# Patient Record
Sex: FEMALE | Race: WHITE | NOT HISPANIC OR LATINO
[De-identification: names, ages, dates, MRNs, and addresses within clinical notes are randomized per-mention and may not be internally consistent; named-entity substitution may affect disease eponyms.]

---

## 2021-02-01 ENCOUNTER — APPOINTMENT (OUTPATIENT)
Dept: OBGYN | Facility: CLINIC | Age: 66
End: 2021-02-01

## 2021-02-08 ENCOUNTER — APPOINTMENT (OUTPATIENT)
Dept: OBGYN | Facility: CLINIC | Age: 66
End: 2021-02-08
Payer: MEDICARE

## 2021-02-08 VITALS
HEIGHT: 60 IN | OXYGEN SATURATION: 97 % | SYSTOLIC BLOOD PRESSURE: 131 MMHG | DIASTOLIC BLOOD PRESSURE: 62 MMHG | BODY MASS INDEX: 56.35 KG/M2 | WEIGHT: 287 LBS | HEART RATE: 71 BPM | TEMPERATURE: 98.4 F

## 2021-02-08 DIAGNOSIS — I10 ESSENTIAL (PRIMARY) HYPERTENSION: ICD-10-CM

## 2021-02-08 DIAGNOSIS — G47.30 SLEEP APNEA, UNSPECIFIED: ICD-10-CM

## 2021-02-08 DIAGNOSIS — N89.8 OTHER SPECIFIED NONINFLAMMATORY DISORDERS OF VAGINA: ICD-10-CM

## 2021-02-08 DIAGNOSIS — F17.200 NICOTINE DEPENDENCE, UNSPECIFIED, UNCOMPLICATED: ICD-10-CM

## 2021-02-08 DIAGNOSIS — N95.0 POSTMENOPAUSAL BLEEDING: ICD-10-CM

## 2021-02-08 PROCEDURE — 99204 OFFICE O/P NEW MOD 45 MIN: CPT

## 2021-02-08 RX ORDER — ALLOPURINOL 100 MG/1
100 TABLET ORAL
Qty: 90 | Refills: 0 | Status: ACTIVE | COMMUNITY
Start: 2020-12-18

## 2021-02-08 RX ORDER — CLOTRIMAZOLE AND BETAMETHASONE DIPROPIONATE 10; .5 MG/G; MG/G
1-0.05 CREAM TOPICAL TWICE DAILY
Qty: 1 | Refills: 1 | Status: ACTIVE | COMMUNITY
Start: 2021-02-08 | End: 1900-01-01

## 2021-02-08 RX ORDER — FUROSEMIDE 20 MG/1
20 TABLET ORAL
Refills: 0 | Status: ACTIVE | COMMUNITY

## 2021-02-08 RX ORDER — APIXABAN 5 MG/1
5 TABLET, FILM COATED ORAL
Refills: 0 | Status: ACTIVE | COMMUNITY

## 2021-02-08 RX ORDER — ATORVASTATIN CALCIUM 10 MG/1
10 TABLET, FILM COATED ORAL
Refills: 0 | Status: ACTIVE | COMMUNITY

## 2021-02-08 RX ORDER — PSYLLIUM SEED
PACKET (EA) ORAL
Refills: 0 | Status: ACTIVE | COMMUNITY

## 2021-02-08 RX ORDER — VALSARTAN 160 MG/1
160 TABLET, COATED ORAL
Refills: 0 | Status: ACTIVE | COMMUNITY

## 2021-02-08 RX ORDER — SITAGLIPTIN 50 MG/1
50 TABLET, FILM COATED ORAL
Refills: 0 | Status: ACTIVE | COMMUNITY

## 2021-02-08 RX ORDER — METOPROLOL SUCCINATE 50 MG/1
50 TABLET, EXTENDED RELEASE ORAL
Qty: 90 | Refills: 0 | Status: ACTIVE | COMMUNITY
Start: 2020-12-22

## 2021-02-08 NOTE — DISCUSSION/SUMMARY
[FreeTextEntry1] : pelvic exam including pap was attempted but incomplete due to body habitus. vaginal canal was deep and narrow, cervix was unable to be visualized or palpated by bimanual exam. pt also had great difficulty being in dorsolithotomy position due to severe back pain. \par TVUS referral provided. further planning pending imaging studies but will most likely need EUA, pap and endometrial biopsy in OR. \par RTC in 2-3 weeks or sooner prn\par [place] PINO

## 2021-02-08 NOTE — HISTORY OF PRESENT ILLNESS
[FreeTextEntry1] : 66yo P2 here for posmenopausal bleeding.\par reports 2weeks ago after masturbation noted vaginal bleeding. for the next 2 days or so she noticed heavy bleeding with clots which then spontaneously stopped. no bleeding today.\par \par she was previously evaluated for PMB and had D&C w/ polypectomy 2015. no bleeding since then until 2 weeks ago. she has not had any gyn exam since then. \par \par reports 2019 she had an accident and had to get back surgery (has screws and rods per pt report). since then she has been ambulating with a walker and has chronic back pain. she has difficulties with ADL. lives alone. has a son who helps out "as much as he can".

## 2021-02-08 NOTE — PHYSICAL EXAM
[Appropriately responsive] : appropriately responsive [Alert] : alert [No Acute Distress] : no acute distress [Labia Majora] : normal [Labia Minora] : normal [Normal] : normal [Atrophy] : atrophy [FreeTextEntry5] : unable to be visualized due to deep/narrow vaginal canal  [FreeTextEntry6] : unable to be palpated due to body habitus

## 2021-02-17 ENCOUNTER — OUTPATIENT (OUTPATIENT)
Dept: OUTPATIENT SERVICES | Facility: HOSPITAL | Age: 66
LOS: 1 days | End: 2021-02-17
Payer: MEDICARE

## 2021-02-17 ENCOUNTER — NON-APPOINTMENT (OUTPATIENT)
Age: 66
End: 2021-02-17

## 2021-02-17 ENCOUNTER — APPOINTMENT (OUTPATIENT)
Dept: ULTRASOUND IMAGING | Facility: HOSPITAL | Age: 66
End: 2021-02-17

## 2021-02-17 DIAGNOSIS — N95.0 POSTMENOPAUSAL BLEEDING: ICD-10-CM

## 2021-02-17 DIAGNOSIS — F17.200 NICOTINE DEPENDENCE, UNSPECIFIED, UNCOMPLICATED: ICD-10-CM

## 2021-02-17 DIAGNOSIS — R58 HEMORRHAGE, NOT ELSEWHERE CLASSIFIED: Chronic | ICD-10-CM

## 2021-02-17 PROCEDURE — 76856 US EXAM PELVIC COMPLETE: CPT | Mod: 26

## 2021-02-17 PROCEDURE — 76856 US EXAM PELVIC COMPLETE: CPT

## 2021-02-17 PROCEDURE — 76830 TRANSVAGINAL US NON-OB: CPT | Mod: 26

## 2021-02-17 PROCEDURE — 76830 TRANSVAGINAL US NON-OB: CPT

## 2021-02-24 ENCOUNTER — TRANSCRIPTION ENCOUNTER (OUTPATIENT)
Age: 66
End: 2021-02-24

## 2021-03-16 ENCOUNTER — OUTPATIENT (OUTPATIENT)
Dept: OUTPATIENT SERVICES | Facility: HOSPITAL | Age: 66
LOS: 1 days | End: 2021-03-16
Payer: MEDICARE

## 2021-03-16 VITALS
WEIGHT: 259.93 LBS | TEMPERATURE: 97 F | OXYGEN SATURATION: 96 % | HEART RATE: 60 BPM | RESPIRATION RATE: 20 BRPM | SYSTOLIC BLOOD PRESSURE: 156 MMHG | DIASTOLIC BLOOD PRESSURE: 79 MMHG | HEIGHT: 63 IN

## 2021-03-16 DIAGNOSIS — I10 ESSENTIAL (PRIMARY) HYPERTENSION: ICD-10-CM

## 2021-03-16 DIAGNOSIS — I48.91 UNSPECIFIED ATRIAL FIBRILLATION: ICD-10-CM

## 2021-03-16 DIAGNOSIS — N95.0 POSTMENOPAUSAL BLEEDING: ICD-10-CM

## 2021-03-16 DIAGNOSIS — Z29.9 ENCOUNTER FOR PROPHYLACTIC MEASURES, UNSPECIFIED: ICD-10-CM

## 2021-03-16 DIAGNOSIS — R58 HEMORRHAGE, NOT ELSEWHERE CLASSIFIED: Chronic | ICD-10-CM

## 2021-03-16 DIAGNOSIS — Z01.818 ENCOUNTER FOR OTHER PREPROCEDURAL EXAMINATION: ICD-10-CM

## 2021-03-16 DIAGNOSIS — M54.9 DORSALGIA, UNSPECIFIED: Chronic | ICD-10-CM

## 2021-03-16 DIAGNOSIS — E11.9 TYPE 2 DIABETES MELLITUS WITHOUT COMPLICATIONS: ICD-10-CM

## 2021-03-16 LAB
ANION GAP SERPL CALC-SCNC: 6 MMOL/L — SIGNIFICANT CHANGE UP (ref 5–17)
APTT BLD: 43.1 SEC — HIGH (ref 27.5–35.5)
BLD GP AB SCN SERPL QL: SIGNIFICANT CHANGE UP
BUN SERPL-MCNC: 22 MG/DL — HIGH (ref 7–18)
CALCIUM SERPL-MCNC: 9.1 MG/DL — SIGNIFICANT CHANGE UP (ref 8.4–10.5)
CHLORIDE SERPL-SCNC: 102 MMOL/L — SIGNIFICANT CHANGE UP (ref 96–108)
CO2 SERPL-SCNC: 31 MMOL/L — SIGNIFICANT CHANGE UP (ref 22–31)
CREAT SERPL-MCNC: 1.12 MG/DL — SIGNIFICANT CHANGE UP (ref 0.5–1.3)
GLUCOSE SERPL-MCNC: 123 MG/DL — HIGH (ref 70–99)
HCT VFR BLD CALC: 44.1 % — SIGNIFICANT CHANGE UP (ref 34.5–45)
HGB BLD-MCNC: 13.8 G/DL — SIGNIFICANT CHANGE UP (ref 11.5–15.5)
MCHC RBC-ENTMCNC: 28.8 PG — SIGNIFICANT CHANGE UP (ref 27–34)
MCHC RBC-ENTMCNC: 31.3 GM/DL — LOW (ref 32–36)
MCV RBC AUTO: 92.1 FL — SIGNIFICANT CHANGE UP (ref 80–100)
NRBC # BLD: 0 /100 WBCS — SIGNIFICANT CHANGE UP (ref 0–0)
PLATELET # BLD AUTO: 226 K/UL — SIGNIFICANT CHANGE UP (ref 150–400)
POTASSIUM SERPL-MCNC: 4.2 MMOL/L — SIGNIFICANT CHANGE UP (ref 3.5–5.3)
POTASSIUM SERPL-SCNC: 4.2 MMOL/L — SIGNIFICANT CHANGE UP (ref 3.5–5.3)
RBC # BLD: 4.79 M/UL — SIGNIFICANT CHANGE UP (ref 3.8–5.2)
RBC # FLD: 14.1 % — SIGNIFICANT CHANGE UP (ref 10.3–14.5)
SODIUM SERPL-SCNC: 139 MMOL/L — SIGNIFICANT CHANGE UP (ref 135–145)
WBC # BLD: 11.12 K/UL — HIGH (ref 3.8–10.5)
WBC # FLD AUTO: 11.12 K/UL — HIGH (ref 3.8–10.5)

## 2021-03-16 PROCEDURE — G0463: CPT

## 2021-03-16 RX ORDER — GABAPENTIN 400 MG/1
1 CAPSULE ORAL
Qty: 0 | Refills: 0 | DISCHARGE

## 2021-03-16 RX ORDER — VALSARTAN 80 MG/1
1 TABLET ORAL
Qty: 0 | Refills: 0 | DISCHARGE

## 2021-03-16 RX ORDER — APIXABAN 2.5 MG/1
1 TABLET, FILM COATED ORAL
Qty: 0 | Refills: 0 | DISCHARGE

## 2021-03-16 RX ORDER — SITAGLIPTIN 50 MG/1
1 TABLET, FILM COATED ORAL
Qty: 0 | Refills: 0 | DISCHARGE

## 2021-03-16 RX ORDER — ALLOPURINOL 300 MG
1 TABLET ORAL
Qty: 0 | Refills: 0 | DISCHARGE

## 2021-03-16 RX ORDER — FUROSEMIDE 40 MG
0 TABLET ORAL
Qty: 0 | Refills: 0 | DISCHARGE

## 2021-03-16 RX ORDER — ATORVASTATIN CALCIUM 80 MG/1
1 TABLET, FILM COATED ORAL
Qty: 0 | Refills: 0 | DISCHARGE

## 2021-03-16 RX ORDER — METOPROLOL TARTRATE 50 MG
1 TABLET ORAL
Qty: 0 | Refills: 0 | DISCHARGE

## 2021-03-16 NOTE — H&P PST ADULT - NSICDXPASTMEDICALHX_GEN_ALL_CORE_FT
PAST MEDICAL HISTORY:  Atrial fibrillation     Cervical stenosis (uterine cervix) as per the patient    Diabetes mellitus     Fibroid May 2015    Gout     HTN - Hypertension     Morbid Obesity     Postmenopausal Bleeding 2015 and 2010    Sleep apnea uses cpap at night    Termination of pregnancy (fetus)     Umbilical hernia

## 2021-03-16 NOTE — H&P PST ADULT - NEGATIVE NEUROLOGICAL SYMPTOMS
No cva/no generalized seizures/no focal seizures/no headache No cva/no transient paralysis/no generalized seizures/no focal seizures/no headache

## 2021-03-16 NOTE — H&P PST ADULT - ENDOCRINE COMMENTS
Elevated blood glucose diagnosed in October 2014 -- supposed to follow up with MD but never went until recent medical clearance; doesn't do finger sticks; denies thyroid disease

## 2021-03-16 NOTE — H&P PST ADULT - ASSESSMENT
this is a 58 y/o female who presents with menorrhagia this is a 64 y/o female with   1.postmenopausal bleeding  2. HTN  3. Atrial Fibrillation  4. DM

## 2021-03-16 NOTE — H&P PST ADULT - NSICDXPASTSURGICALHX_GEN_ALL_CORE_FT
PAST SURGICAL HISTORY:  Back pain with history of spinal surgery 2019    Bleeding "Unsuccessful D&C"    C Section in 1979 and 1983    S/P Tonsillectomy

## 2021-03-16 NOTE — H&P PST ADULT - ATTENDING COMMENTS
60 yo with postmenopausal bleeding, MRI with submucosal fibroid.  Plan for hysteroscopic resection of uterine mass/fibroid.  Discussed risk of bleeding/blood transfusion/uterine perforation.  Consent obtained in preoperative holding area.

## 2021-03-16 NOTE — H&P PST ADULT - HISTORY OF PRESENT ILLNESS
This is a 58 y/o female who presents with h/o post menopausal bleeding. Underwent "unsuccessful" D&C in 2010 (states the "opening is narrow" -- cervical stenosis of uterus). Recently bleeding restarted. Visited MD who recommended intervention. Scheduled for operative hysteroscopy with resectoscopy on 5-15-15 This is a 64 y/o female with PMH of HTN, HLD, Atrial Fibrillation on Eliquis, Gout, DM, current cigarette smoker, KVNG on CPAP  presents with post menopausal bleeding. Patient had previous episodes of postmenopausal bleeding in 2010, 2015, Underwent  D&C and hysteroscopy. This time patient scheduled again for D&C and Hysteroscopy on 4/1/21.

## 2021-03-16 NOTE — H&P PST ADULT - NSICDXPROBLEM_GEN_ALL_CORE_FT
PROBLEM DIAGNOSES  Problem: Postmenopausal bleeding  Assessment and Plan: Patient is scheduled for D&C/Hysteroscopy on 4/1/21. Preop instructions given.    Problem: HTN (hypertension)  Assessment and Plan: Patient instructed to continue antihypertensive medications as prescibed.    Problem: Atrial fibrillation  Assessment and Plan: Patient instructed to hold Eliquis 48 hrs prior to surgery. Pt verbalized understanding.    Problem: Diabetes mellitus  Assessment and Plan: Patient instructed to hold oral hypoglycemic agent the morning of surgery to prevent hypoglycemia.    Problem: Need for prophylactic measure  Assessment and Plan: Patient instructed to take a shower the morning or night before surgery with chloehedine wash, bottle provide. Pt verbalized understanding.

## 2021-03-16 NOTE — H&P PST ADULT - NSICDXFAMILYHX_GEN_ALL_CORE_FT
FAMILY HISTORY:  Family history of hypertension    Father  Still living? No  Family history of stroke, Age at diagnosis: Age Unknown    Mother  Still living? No  Family history of hypertension, Age at diagnosis: Age Unknown  Family history of stroke, Age at diagnosis: Age Unknown

## 2021-03-28 DIAGNOSIS — Z01.818 ENCOUNTER FOR OTHER PREPROCEDURAL EXAMINATION: ICD-10-CM

## 2021-03-29 ENCOUNTER — APPOINTMENT (OUTPATIENT)
Dept: DISASTER EMERGENCY | Facility: CLINIC | Age: 66
End: 2021-03-29

## 2021-03-30 LAB — SARS-COV-2 N GENE NPH QL NAA+PROBE: NOT DETECTED

## 2021-03-31 ENCOUNTER — TRANSCRIPTION ENCOUNTER (OUTPATIENT)
Age: 66
End: 2021-03-31

## 2021-04-01 ENCOUNTER — RESULT REVIEW (OUTPATIENT)
Age: 66
End: 2021-04-01

## 2021-04-01 ENCOUNTER — OUTPATIENT (OUTPATIENT)
Dept: OUTPATIENT SERVICES | Facility: HOSPITAL | Age: 66
LOS: 1 days | End: 2021-04-01
Payer: MEDICARE

## 2021-04-01 VITALS
DIASTOLIC BLOOD PRESSURE: 62 MMHG | SYSTOLIC BLOOD PRESSURE: 130 MMHG | TEMPERATURE: 98 F | HEART RATE: 62 BPM | OXYGEN SATURATION: 98 % | RESPIRATION RATE: 16 BRPM | WEIGHT: 259.93 LBS | HEIGHT: 63 IN

## 2021-04-01 VITALS
RESPIRATION RATE: 16 BRPM | OXYGEN SATURATION: 95 % | DIASTOLIC BLOOD PRESSURE: 69 MMHG | TEMPERATURE: 97 F | HEART RATE: 60 BPM | SYSTOLIC BLOOD PRESSURE: 123 MMHG

## 2021-04-01 DIAGNOSIS — R58 HEMORRHAGE, NOT ELSEWHERE CLASSIFIED: Chronic | ICD-10-CM

## 2021-04-01 DIAGNOSIS — M54.9 DORSALGIA, UNSPECIFIED: Chronic | ICD-10-CM

## 2021-04-01 DIAGNOSIS — N95.0 POSTMENOPAUSAL BLEEDING: ICD-10-CM

## 2021-04-01 LAB — BLD GP AB SCN SERPL QL: SIGNIFICANT CHANGE UP

## 2021-04-01 PROCEDURE — 58558 HYSTEROSCOPY BIOPSY: CPT

## 2021-04-01 PROCEDURE — 86901 BLOOD TYPING SEROLOGIC RH(D): CPT

## 2021-04-01 PROCEDURE — 85730 THROMBOPLASTIN TIME PARTIAL: CPT

## 2021-04-01 PROCEDURE — 86850 RBC ANTIBODY SCREEN: CPT

## 2021-04-01 PROCEDURE — 85610 PROTHROMBIN TIME: CPT

## 2021-04-01 PROCEDURE — 87624 HPV HI-RISK TYP POOLED RSLT: CPT

## 2021-04-01 PROCEDURE — 88305 TISSUE EXAM BY PATHOLOGIST: CPT

## 2021-04-01 PROCEDURE — 88305 TISSUE EXAM BY PATHOLOGIST: CPT | Mod: 26

## 2021-04-01 PROCEDURE — 36415 COLL VENOUS BLD VENIPUNCTURE: CPT

## 2021-04-01 PROCEDURE — 82962 GLUCOSE BLOOD TEST: CPT

## 2021-04-01 PROCEDURE — 86900 BLOOD TYPING SEROLOGIC ABO: CPT

## 2021-04-01 RX ORDER — ACETAMINOPHEN 500 MG
2 TABLET ORAL
Qty: 40 | Refills: 1
Start: 2021-04-01 | End: 2021-04-10

## 2021-04-01 RX ORDER — HYDROMORPHONE HYDROCHLORIDE 2 MG/ML
1 INJECTION INTRAMUSCULAR; INTRAVENOUS; SUBCUTANEOUS
Refills: 0 | Status: DISCONTINUED | OUTPATIENT
Start: 2021-04-01 | End: 2021-04-01

## 2021-04-01 RX ORDER — SODIUM CHLORIDE 9 MG/ML
3 INJECTION INTRAMUSCULAR; INTRAVENOUS; SUBCUTANEOUS EVERY 8 HOURS
Refills: 0 | Status: DISCONTINUED | OUTPATIENT
Start: 2021-04-01 | End: 2021-04-01

## 2021-04-01 RX ORDER — HYDROMORPHONE HYDROCHLORIDE 2 MG/ML
0.5 INJECTION INTRAMUSCULAR; INTRAVENOUS; SUBCUTANEOUS
Refills: 0 | Status: DISCONTINUED | OUTPATIENT
Start: 2021-04-01 | End: 2021-04-01

## 2021-04-01 RX ORDER — IBUPROFEN 200 MG
1 TABLET ORAL
Qty: 12 | Refills: 0
Start: 2021-04-01 | End: 2021-04-03

## 2021-04-01 RX ORDER — IBUPROFEN 200 MG
600 TABLET ORAL EVERY 6 HOURS
Refills: 0 | Status: DISCONTINUED | OUTPATIENT
Start: 2021-04-01 | End: 2021-04-08

## 2021-04-01 NOTE — ASU DISCHARGE PLAN (ADULT/PEDIATRIC) - CARE PROVIDER_API CALL
John Godwin)  Obstetrics and Gynecology  [id number] [place] [address]  Phone: [phone]  Fax: [phone]  Follow Up Time: 2 weeks

## 2021-04-01 NOTE — ASU DISCHARGE PLAN (ADULT/PEDIATRIC) - CALL YOUR DOCTOR IF YOU HAVE ANY OF THE FOLLOWING:
Bleeding that does not stop/Pain not relieved by Medications/Fever greater than (need to indicate Fahrenheit or Celsius)/Nausea and vomiting that does not stop/Excessive diarrhea/Inability to tolerate liquids or foods/Increased irritability or sluggishness Bleeding that does not stop/Pain not relieved by Medications/Fever greater than (need to indicate Fahrenheit or Celsius)/Wound/Surgical Site with redness, or foul smelling discharge or pus/Nausea and vomiting that does not stop/Excessive diarrhea/Inability to tolerate liquids or foods/Increased irritability or sluggishness

## 2021-04-01 NOTE — ASU DISCHARGE PLAN (ADULT/PEDIATRIC) - ASU DC SPECIAL INSTRUCTIONSFT
no sex nothing in vagina no heavy lifting no pushing eat high fiber food ambulation daily as tolerated shower daily  see your gynecologist in 1-2wks for follow up

## 2021-04-01 NOTE — BRIEF OPERATIVE NOTE - NSICDXBRIEFPROCEDURE_GEN_ALL_CORE_FT
PROCEDURES:  Dilation and curettage with polypectomy 01-Apr-2021 15:33:58  John Godwin  Hysteroscopy with biopsy 01-Apr-2021 15:34:13  John Godwin

## 2021-04-01 NOTE — BRIEF OPERATIVE NOTE - OPERATION/FINDINGS
narrow and atrophic vaginal canal  cervix appeared wnl  uterine cavity with long uterine polyp. submucosal myoma on right side consistent with previous MRI findings  otherwise cavity appeared atrophic  b/l os visualized and appeared wnl

## 2021-04-02 ENCOUNTER — NON-APPOINTMENT (OUTPATIENT)
Age: 66
End: 2021-04-02

## 2021-04-02 PROBLEM — E11.9 TYPE 2 DIABETES MELLITUS WITHOUT COMPLICATIONS: Chronic | Status: ACTIVE | Noted: 2021-03-16

## 2021-04-02 PROBLEM — M10.9 GOUT, UNSPECIFIED: Chronic | Status: ACTIVE | Noted: 2021-03-16

## 2021-04-02 PROBLEM — I48.91 UNSPECIFIED ATRIAL FIBRILLATION: Chronic | Status: ACTIVE | Noted: 2021-03-16

## 2021-04-02 LAB — HPV HIGH+LOW RISK DNA PNL CVX: SIGNIFICANT CHANGE UP

## 2021-04-05 LAB — SURGICAL PATHOLOGY STUDY: SIGNIFICANT CHANGE UP

## 2021-04-19 ENCOUNTER — APPOINTMENT (OUTPATIENT)
Dept: OBGYN | Facility: CLINIC | Age: 66
End: 2021-04-19
Payer: MEDICARE

## 2021-04-19 VITALS
SYSTOLIC BLOOD PRESSURE: 132 MMHG | DIASTOLIC BLOOD PRESSURE: 85 MMHG | HEART RATE: 66 BPM | OXYGEN SATURATION: 97 % | WEIGHT: 287 LBS | TEMPERATURE: 97.7 F | BODY MASS INDEX: 56.35 KG/M2 | HEIGHT: 60 IN

## 2021-04-19 DIAGNOSIS — Z98.890 OTHER SPECIFIED POSTPROCEDURAL STATES: ICD-10-CM

## 2021-04-19 PROCEDURE — 99024 POSTOP FOLLOW-UP VISIT: CPT

## 2021-04-19 NOTE — PLAN
[None] : None [de-identified] : path d/w pt. pap/hpv result pending, will contact pathology department regarding status. will call pt w/ result. pt to return in 1yr for annual exam or soonre prn.

## 2021-04-19 NOTE — HISTORY OF PRESENT ILLNESS
[Pain is well-controlled] : pain is well-controlled [Fever] : no fever [Chills] : no chills [Nausea] : no nausea [Vomiting] : no vomiting [Diarrhea] : no diarrhea [Vaginal Bleeding] : no vaginal bleeding [Pelvic Pressure] : no pelvic pressure [Dysuria] : no dysuria [Vaginal Discharge] : no vaginal discharge [Constipation] : no constipation [Pathology reviewed] : pathology reviewed [de-identified] : 18

## 2023-02-14 ENCOUNTER — APPOINTMENT (OUTPATIENT)
Dept: VASCULAR SURGERY | Facility: CLINIC | Age: 68
End: 2023-02-14
Payer: MEDICARE

## 2023-02-14 VITALS — HEART RATE: 78 BPM | SYSTOLIC BLOOD PRESSURE: 137 MMHG | DIASTOLIC BLOOD PRESSURE: 86 MMHG

## 2023-02-14 VITALS — BODY MASS INDEX: 45.45 KG/M2 | HEIGHT: 62 IN | TEMPERATURE: 98.2 F | WEIGHT: 247 LBS

## 2023-02-14 PROCEDURE — 93970 EXTREMITY STUDY: CPT

## 2023-02-14 PROCEDURE — 99204 OFFICE O/P NEW MOD 45 MIN: CPT | Mod: 25

## 2023-02-14 PROCEDURE — 93923 UPR/LXTR ART STDY 3+ LVLS: CPT

## 2023-02-14 PROCEDURE — 99406 BEHAV CHNG SMOKING 3-10 MIN: CPT

## 2023-02-14 PROCEDURE — 29580 STRAPPING UNNA BOOT: CPT | Mod: LT

## 2023-02-14 RX ORDER — AMOXICILLIN AND CLAVULANATE POTASSIUM 500; 125 MG/1; MG/1
500-125 TABLET, FILM COATED ORAL
Qty: 20 | Refills: 1 | Status: ACTIVE | COMMUNITY
Start: 2023-02-14 | End: 1900-01-01

## 2023-02-14 NOTE — HISTORY OF PRESENT ILLNESS
[FreeTextEntry1] : Pt c/t bilateral leg pain swelling heaviness and discomfort worse w activity and by the end of the day\par Onset  sev mo ago \par Intensity moderate \par Pt denies recent injury, travel or thrombophilic event\par \par Pt c/o occasional leora leg and foot nocturnal cramps 1-2/year\par \par Pt denies any recent  cardiac c/o , SOB, Chest Pain or recent heart attacks \par \par pt states left shin wound onset  3 weeks ago  wdiscomfort \par \par pt smokes 1 ppd   50 years \par \par

## 2023-02-14 NOTE — PROCEDURE
[FreeTextEntry1] : Left Unna boot applied from toes to knee  w/o complications\par Pt sole procedure well\par Pt is not allergic to the unna boot\par

## 2023-02-14 NOTE — ASSESSMENT
[Arterial/Venous Disease] : arterial/venous disease [Medication Management] : medication management [Other: _____] : [unfilled] [FreeTextEntry1] : Impression symptomatic arterial insuff , symptomatic venous insuff  wlle wound and lle shin cellulitis \par \par \par Plan Med Conservative management leg elevation, knee high compression stockings  20-30mm Hg (rx given), wt loss, diet control, exercise program, protective measures\par bedside d/w pt to cut back and quit smoking 5 min \par d/w pt rle gsv rfa if  venous insuff sx worsen \par pt wants to hold off \par d/wpt to start rle comp stocking use\par pt wants to hold off on comp stockings and be re eval after lle wound heals \par d/w pt trial pletal if art insuff sx worsen\par pt wants to hold off and be re eval \par  d/w pt   vns and continue lle unna boot 2x/week till lle wound heals\par start sugmentin 500 bid 10 days\par rto in 2-3 weeks to re eval \par \par \par Letter faxed to Dr SCOT Gunter MD \par \par \par Varicose veins are enlarged, twisted veins. Varicose veins are caused by increased blood pressure in the veins.  The blood moves towards the heart by 1-way valves in the veins. When the valves become weakened or damaged, blood can collect in the veins and pool in your lower legs (ankles). This causes the veins to become enlarged and incompetent with reflux. Sitting or standing for long periods can cause blood to pool in the leg veins, increasing the pressure within the veins. \par Risk factors for varicose veins or venous disease may include:  obesity, older age, standing or sitting for prolonged periods of time for several years, being female, pregnancy, taking oral contraceptive pills or hormone replacement, being inactive, and/or smoking. \par The most common symptoms of varicose veins are sensations in the legs, such as a heavy feeling, burning, and/or aching. However, each individual may experience symptoms differently.  Other symptoms may include:  color changes in the skin, sores on the legs, or rash.  Severe varicose veins or venous disease may eventually produce long-term mild swelling that can result in more serious skin and tissue problems, such as ulcers and non-healing sores.\par Varicose veins and venous disease are diagnosed by a complete medical history, physical examination, and diagnostic studies for varicose veins including duplex ultrasound and color-flow imaging.  \par Medical treatment for varicose veins and venous disease include:  compression stockings, sclerotherapy, endovenous ablation and/or surgical treatment with microphlebectomy.  \par \par

## 2023-02-14 NOTE — DATA REVIEWED
[FreeTextEntry1] : 2/14/2023 Venous Doppler Bill LE  no acute dvt svt \par                            RLE GSV  insuff from sfj to mid thigh w insuff thigh and calf collaterals\par                            LLE  insuff thigh and calf collaterals \par                                     \par \par 2/14/2023 LUZ MARINA/PVR RLE mod infra inguinal and LLE mod infra inguinal geniculate arterial insuff \par                                    w vessel calcification\par                                  Rt LUZ MARINA  .93 Lt LUZ MARINA  .87\par                              \par

## 2023-02-14 NOTE — PHYSICAL EXAM
[1+] : left 1+ [Ankle Swelling (On Exam)] : present [Ankle Swelling Bilaterally] : bilaterally  [Varicose Veins Of Lower Extremities] : bilaterally [] : bilaterally [Ankle Swelling On The Right] : mild [Alert] : alert [Ankle Swelling On The Left] : moderate [Oriented to Person] : oriented to person [Oriented to Place] : oriented to place [Oriented to Time] : oriented to time [Calm] : calm [JVD] : no jugular venous distention  [de-identified] : nad  [de-identified] : wnl [de-identified] : cta leora  [FreeTextEntry1] : difficult to appreciate pulses due to edema\par Mild  arterial insufficiency w mild trophic skin changes \par Moderate  bilateral leg venous insufficiency \par w moderate bilateral leg stasis dermatitis, hyperpigmentation in the gator area, lipodermatosclerosis, hyperkeratosis (skin thickening), and lymphorrhea (weeping) from left  distal shin wound \par and moderate bilateral leg edema \par Multiple  bilateral leg small varicose  veins and spider veins  ant post medial calf and shin \par RLE Varicose veins measuring  1-3 mm in size on the calf /shin \par LLE Varicose veins measuring  1-3 mm in size on the calf /shin \par left mid to distal shin wound w eschar  in cribriform  pattern  2-3 mm w mild cellulitis \par  [de-identified] : wnl [de-identified] : Bill Cranial nerves 2-12 bill grossly intact [de-identified] : cooperative

## 2023-02-17 ENCOUNTER — APPOINTMENT (OUTPATIENT)
Dept: VASCULAR SURGERY | Facility: CLINIC | Age: 68
End: 2023-02-17
Payer: MEDICARE

## 2023-02-17 VITALS
HEART RATE: 101 BPM | SYSTOLIC BLOOD PRESSURE: 128 MMHG | WEIGHT: 247 LBS | BODY MASS INDEX: 45.45 KG/M2 | TEMPERATURE: 97.4 F | DIASTOLIC BLOOD PRESSURE: 88 MMHG | HEIGHT: 62 IN

## 2023-02-17 DIAGNOSIS — L03.116 CELLULITIS OF LEFT LOWER LIMB: ICD-10-CM

## 2023-02-17 PROCEDURE — 29580 STRAPPING UNNA BOOT: CPT | Mod: LT

## 2023-02-17 PROCEDURE — 99212 OFFICE O/P EST SF 10 MIN: CPT | Mod: 25

## 2023-02-17 NOTE — HISTORY OF PRESENT ILLNESS
[FreeTextEntry1] : Pt c/t bilateral leg pain swelling heaviness and discomfort worse w activity and by the end of the day\par Onset  sev mo ago \par Intensity moderate \par Pt denies recent injury, travel or thrombophilic event\par \par Pt c/o occasional leora leg and foot nocturnal cramps 1-2/year\par \par Pt denies any recent  cardiac c/o , SOB, Chest Pain or recent heart attacks \par \par pt states left shin wound onset  3 weeks ago  wdiscomfort \par \par pt smokes 1 ppd   50 years \par \par  [de-identified] : pt here for left leg unna boot change\par pt is still waiting for home care services for left leg unna boot 2x/week\par left anterior shin ulcer remains\par bilateral leg edema remains\par no new ulcers or wounds\par pt is still smoking

## 2023-02-17 NOTE — ASSESSMENT
[FreeTextEntry1] : Impression symptomatic arterial insuff , symptomatic venous insuff  wlle wound and lle shin cellulitis \par \par \par Plan Med Conservative management leg elevation, knee high compression stockings  20-30mm Hg (rx given), wt loss, diet control, exercise program, protective measures\par bedside d/w pt to cut back and quit smoking 5 min \par d/w pt rle gsv rfa if  venous insuff sx worsen \par pt wants to hold off \par d/wpt to start rle comp stocking use\par pt wants to hold off on comp stockings and be re eval after lle wound heals \par d/w pt trial pletal if art insuff sx worsen\par pt wants to hold off and be re eval \par  d/w pt   vns and continue lle unna boot 2x/week till lle wound heals\par start sugmentin 500 bid 10 days\par rto in 2-3 weeks to re eval \par rto in 1 week for lle unna boot if pt doesn't hear back from home care\par \par \par Letter faxed to Dr SCOT Gunter MD \par \par \par Varicose veins are enlarged, twisted veins. Varicose veins are caused by increased blood pressure in the veins.  The blood moves towards the heart by 1-way valves in the veins. When the valves become weakened or damaged, blood can collect in the veins and pool in your lower legs (ankles). This causes the veins to become enlarged and incompetent with reflux. Sitting or standing for long periods can cause blood to pool in the leg veins, increasing the pressure within the veins. \par Risk factors for varicose veins or venous disease may include:  obesity, older age, standing or sitting for prolonged periods of time for several years, being female, pregnancy, taking oral contraceptive pills or hormone replacement, being inactive, and/or smoking. \par The most common symptoms of varicose veins are sensations in the legs, such as a heavy feeling, burning, and/or aching. However, each individual may experience symptoms differently.  Other symptoms may include:  color changes in the skin, sores on the legs, or rash.  Severe varicose veins or venous disease may eventually produce long-term mild swelling that can result in more serious skin and tissue problems, such as ulcers and non-healing sores.\par Varicose veins and venous disease are diagnosed by a complete medical history, physical examination, and diagnostic studies for varicose veins including duplex ultrasound and color-flow imaging.  \par Medical treatment for varicose veins and venous disease include:  compression stockings, sclerotherapy, endovenous ablation and/or surgical treatment with microphlebectomy.  \par \par  [Arterial/Venous Disease] : arterial/venous disease [Medication Management] : medication management [Other: _____] : [unfilled]

## 2023-02-17 NOTE — PHYSICAL EXAM
[JVD] : no jugular venous distention  [1+] : left 1+ [Ankle Swelling Bilaterally] : bilaterally  [Ankle Swelling (On Exam)] : present [Varicose Veins Of Lower Extremities] : bilaterally [Ankle Swelling On The Right] : mild [] : bilaterally [Ankle Swelling On The Left] : moderate [Alert] : alert [Oriented to Person] : oriented to person [Oriented to Place] : oriented to place [Oriented to Time] : oriented to time [Calm] : calm [de-identified] : nad  [de-identified] : wnl [de-identified] : no respiratory distres [FreeTextEntry1] : difficult to appreciate pulses due to edema\par Mild  arterial insufficiency w mild trophic skin changes \par Moderate  bilateral leg venous insufficiency \par w moderate bilateral leg stasis dermatitis, hyperpigmentation in the gator area, lipodermatosclerosis, hyperkeratosis (skin thickening), and lymphorrhea (weeping) from left  distal shin wound \par and moderate bilateral leg edema \par Multiple  bilateral leg small varicose  veins and spider veins  ant post medial calf and shin \par RLE Varicose veins measuring  1-3 mm in size on the calf /shin \par LLE Varicose veins measuring  1-3 mm in size on the calf /shin \par left mid to distal shin wound w eschar  in cribriform  pattern  2-3 mm w mild cellulitis \par  [de-identified] : uses a walker [de-identified] : Bill Cranial nerves 2-12 bill grossly intact [de-identified] : cooperative

## 2023-02-24 ENCOUNTER — APPOINTMENT (OUTPATIENT)
Dept: VASCULAR SURGERY | Facility: CLINIC | Age: 68
End: 2023-02-24

## 2023-03-07 ENCOUNTER — APPOINTMENT (OUTPATIENT)
Dept: VASCULAR SURGERY | Facility: CLINIC | Age: 68
End: 2023-03-07
Payer: MEDICARE

## 2023-03-07 VITALS
WEIGHT: 247 LBS | TEMPERATURE: 99.1 F | HEIGHT: 62 IN | BODY MASS INDEX: 45.45 KG/M2 | HEART RATE: 98 BPM | DIASTOLIC BLOOD PRESSURE: 76 MMHG | SYSTOLIC BLOOD PRESSURE: 113 MMHG

## 2023-03-07 PROCEDURE — 99406 BEHAV CHNG SMOKING 3-10 MIN: CPT

## 2023-03-07 PROCEDURE — 99213 OFFICE O/P EST LOW 20 MIN: CPT | Mod: 25

## 2023-03-07 NOTE — ASSESSMENT
[Arterial/Venous Disease] : arterial/venous disease [Medication Management] : medication management [Other: _____] : [unfilled] [FreeTextEntry1] : Impression symptomatic arterial insuff , symptomatic venous insuff  w healed wounds \par \par \par Plan Med Conservative management leg elevation, knee high compression stockings  20-30mm Hg, wt loss, diet control, exercise program, protective measures\par bedside d/w pt to cut back and quit smoking 5 min \par d/wpt to start rle comp stocking use\par pt wants to hold off on comp stockings and be re eval\par d/w pt trial pletal if art insuff sx worsen\par pt wants to hold off and be re eval \par ov  in 2 mo to re eval \par \par \par Letter faxed to Dr SCOT Gunter MD \par \par \par Varicose veins are enlarged, twisted veins. Varicose veins are caused by increased blood pressure in the veins.  The blood moves towards the heart by 1-way valves in the veins. When the valves become weakened or damaged, blood can collect in the veins and pool in your lower legs (ankles). This causes the veins to become enlarged and incompetent with reflux. Sitting or standing for long periods can cause blood to pool in the leg veins, increasing the pressure within the veins. \par Risk factors for varicose veins or venous disease may include:  obesity, older age, standing or sitting for prolonged periods of time for several years, being female, pregnancy, taking oral contraceptive pills or hormone replacement, being inactive, and/or smoking. \par The most common symptoms of varicose veins are sensations in the legs, such as a heavy feeling, burning, and/or aching. However, each individual may experience symptoms differently.  Other symptoms may include:  color changes in the skin, sores on the legs, or rash.  Severe varicose veins or venous disease may eventually produce long-term mild swelling that can result in more serious skin and tissue problems, such as ulcers and non-healing sores.\par Varicose veins and venous disease are diagnosed by a complete medical history, physical examination, and diagnostic studies for varicose veins including duplex ultrasound and color-flow imaging.  \par Medical treatment for varicose veins and venous disease include:  compression stockings, sclerotherapy, endovenous ablation and/or surgical treatment with microphlebectomy.  \par \par

## 2023-03-07 NOTE — HISTORY OF PRESENT ILLNESS
[FreeTextEntry1] : Pt c/t bilateral leg pain swelling heaviness and discomfort worse w activity and by the end of the day\par Onset  sev mo ago \par Intensity moderate \par Pt denies recent injury, travel or thrombophilic event\par \par Pt c/o occasional leora leg and foot nocturnal cramps 1-2/year\par \par Pt denies any recent  cardiac c/o , SOB, Chest Pain or recent heart attacks \par \par pt states left shin wound onset  3 weeks ago  wdiscomfort \par \par pt smokes 1 ppd   50 years \par \par  [de-identified] : pt  states left leg wounds have healed and she is feeling better \par pt still smokes but has cut back

## 2023-03-07 NOTE — PHYSICAL EXAM
[1+] : left 1+ [Ankle Swelling (On Exam)] : present [Ankle Swelling Bilaterally] : bilaterally  [Varicose Veins Of Lower Extremities] : bilaterally [Ankle Swelling On The Right] : mild [] : bilaterally [Ankle Swelling On The Left] : moderate [Alert] : alert [Oriented to Person] : oriented to person [Oriented to Place] : oriented to place [Oriented to Time] : oriented to time [Calm] : calm [JVD] : no jugular venous distention  [de-identified] : nad  [de-identified] : wnl [de-identified] : no respiratory distres [FreeTextEntry1] : Mild  arterial insufficiency w mild trophic skin changes \par Moderate  bilateral leg venous insufficiency \par w moderate bilateral leg stasis dermatitis, hyperpigmentation in the gator area, lipodermatosclerosis, hyperkeratosis (skin thickening)\par and moderate bilateral leg edema \par Multiple  bilateral leg small varicose  veins and spider veins  ant post medial calf and shin \par RLE Varicose veins measuring  1-3 mm in size on the calf /shin \par LLE Varicose veins measuring  1-3 mm in size on the calf /shin \par no wounds \par  [de-identified] : uses a walker [de-identified] : Bill Cranial nerves 2-12 bill grossly intact [de-identified] : cooperative

## 2023-03-08 ENCOUNTER — NON-APPOINTMENT (OUTPATIENT)
Age: 68
End: 2023-03-08

## 2023-05-09 ENCOUNTER — APPOINTMENT (OUTPATIENT)
Dept: VASCULAR SURGERY | Facility: CLINIC | Age: 68
End: 2023-05-09

## 2023-11-03 ENCOUNTER — NON-APPOINTMENT (OUTPATIENT)
Age: 68
End: 2023-11-03

## 2023-11-06 ENCOUNTER — APPOINTMENT (OUTPATIENT)
Dept: VASCULAR SURGERY | Facility: CLINIC | Age: 68
End: 2023-11-06
Payer: MEDICARE

## 2023-11-06 VITALS
TEMPERATURE: 98.2 F | SYSTOLIC BLOOD PRESSURE: 137 MMHG | WEIGHT: 247 LBS | HEART RATE: 121 BPM | HEIGHT: 62 IN | DIASTOLIC BLOOD PRESSURE: 90 MMHG | BODY MASS INDEX: 45.45 KG/M2

## 2023-11-06 PROCEDURE — 29580 STRAPPING UNNA BOOT: CPT | Mod: 50

## 2023-11-06 PROCEDURE — 99213 OFFICE O/P EST LOW 20 MIN: CPT | Mod: 25

## 2023-11-10 ENCOUNTER — APPOINTMENT (OUTPATIENT)
Dept: VASCULAR SURGERY | Facility: CLINIC | Age: 68
End: 2023-11-10
Payer: MEDICARE

## 2023-11-10 VITALS
DIASTOLIC BLOOD PRESSURE: 76 MMHG | SYSTOLIC BLOOD PRESSURE: 121 MMHG | WEIGHT: 247 LBS | BODY MASS INDEX: 45.45 KG/M2 | HEART RATE: 114 BPM | HEIGHT: 62 IN

## 2023-11-10 PROCEDURE — 29580 STRAPPING UNNA BOOT: CPT | Mod: 50

## 2023-11-10 PROCEDURE — 99212 OFFICE O/P EST SF 10 MIN: CPT | Mod: 25

## 2023-11-14 ENCOUNTER — APPOINTMENT (OUTPATIENT)
Dept: VASCULAR SURGERY | Facility: CLINIC | Age: 68
End: 2023-11-14

## 2023-12-05 ENCOUNTER — APPOINTMENT (OUTPATIENT)
Dept: VASCULAR SURGERY | Facility: CLINIC | Age: 68
End: 2023-12-05
Payer: MEDICARE

## 2023-12-05 VITALS
WEIGHT: 247 LBS | SYSTOLIC BLOOD PRESSURE: 124 MMHG | HEIGHT: 62 IN | BODY MASS INDEX: 45.45 KG/M2 | HEART RATE: 93 BPM | TEMPERATURE: 98.6 F | DIASTOLIC BLOOD PRESSURE: 83 MMHG

## 2023-12-05 DIAGNOSIS — I73.9 PERIPHERAL VASCULAR DISEASE, UNSPECIFIED: ICD-10-CM

## 2023-12-05 PROCEDURE — 99213 OFFICE O/P EST LOW 20 MIN: CPT

## 2024-03-12 ENCOUNTER — APPOINTMENT (OUTPATIENT)
Dept: VASCULAR SURGERY | Facility: CLINIC | Age: 69
End: 2024-03-12
Payer: MEDICARE

## 2024-03-12 VITALS
TEMPERATURE: 98.1 F | HEIGHT: 62 IN | WEIGHT: 245 LBS | DIASTOLIC BLOOD PRESSURE: 79 MMHG | BODY MASS INDEX: 45.08 KG/M2 | SYSTOLIC BLOOD PRESSURE: 122 MMHG | HEART RATE: 89 BPM

## 2024-03-12 PROCEDURE — 99213 OFFICE O/P EST LOW 20 MIN: CPT | Mod: 25

## 2024-03-12 PROCEDURE — 29580 STRAPPING UNNA BOOT: CPT | Mod: 50

## 2024-03-12 NOTE — PROCEDURE
[FreeTextEntry1] : Bill  Unna boot applied from toes to knee  w/o complications Pt sole procedure well Pt is not allergic to the unna boot

## 2024-03-12 NOTE — ASSESSMENT
[Other: _____] : [unfilled] [Arterial/Venous Disease] : arterial/venous disease [FreeTextEntry1] : Impression symptomatic arterial insuff , symptomatic venous insuff w healed RLE shin wound   Plan Med Conservative management leg elevation, moisturize skin, knee high 20-30 mm hg compression stockings, wt loss, diet control, exercise program, protective measures bilateral unna boot placed today for compression  advised to remove bilateral unna boot in 3-5 days and then transition to compression stockings bedside d/w pt to cut back and quit smoking 5 min return to office in 1 month April 2024 to evaluate bilateral legs

## 2024-03-12 NOTE — HISTORY OF PRESENT ILLNESS
[de-identified] : pt here to evaluate bilateral leg swelling not compliant with leg elevation or compression stockings legs are more swollen denies injury or trauma pt still smokes 1 ppd denies claudication or rest pain no wounds or ulcers [FreeTextEntry1] : Pt c/t bilateral leg pain swelling heaviness and discomfort worse w activity and by the end of the day\par  Onset  sev mo ago \par  Intensity moderate \par  Pt denies recent injury, travel or thrombophilic event\par  \par  Pt c/o occasional leora leg and foot nocturnal cramps 1-2/year\par  \par  Pt denies any recent  cardiac c/o , SOB, Chest Pain or recent heart attacks \par  \par  pt states left shin wound onset  3 weeks ago  wdiscomfort \par  \par  pt smokes 1 ppd   50 years \par  \par

## 2024-03-12 NOTE — DATA REVIEWED
[FreeTextEntry1] : 2/14/2023 Venous Doppler Bill LE  no acute dvt svt                             RLE GSV  insuff from sfj to mid thigh w insuff thigh and calf collaterals                            LLE  insuff thigh and calf collaterals                                        2/14/2023 LUZ MARINA/PVR RLE mod infra inguinal and LLE mod infra inguinal geniculate arterial insuff                                     w vessel calcification                                  Rt LUZ MARINA  .93 Lt LUZ MARINA  .87                               11/6/2023 pt refuses bilateral lower extremity venous doppler  3/12/2024 pt refuses bilateral lower extremity venous doppler

## 2024-03-12 NOTE — PHYSICAL EXAM
[1+] : left 1+ [Ankle Swelling (On Exam)] : present [Ankle Swelling Bilaterally] : bilaterally  [Varicose Veins Of Lower Extremities] : bilaterally [Ankle Swelling On The Right] : mild [] : bilaterally [Ankle Swelling On The Left] : moderate [No Rash or Lesion] : No rash or lesion [Alert] : alert [Oriented to Person] : oriented to person [Oriented to Place] : oriented to place [Oriented to Time] : oriented to time [Calm] : calm [de-identified] : nad  [JVD] : no jugular venous distention  [de-identified] : no respiratory distres [de-identified] : wnl [de-identified] : uses a walker [FreeTextEntry1] : Mild  arterial insufficiency w mild trophic skin changes  Moderate  bilateral leg venous insufficiency  w moderate bilateral leg stasis dermatitis, hyperpigmentation in the gator area, lipodermatosclerosis, hyperkeratosis (skin thickening) and moderate bilateral leg edema  Multiple bilateral leg small varicose  veins and spider veins  ant post medial calf and shin  RLE Varicose veins measuring  1-3 mm in size on the calf /shin  LLE Varicose veins measuring  1-3 mm in size on the calf /shin  no wounds or ulcers [de-identified] : cooperative [de-identified] : Bill Cranial nerves 2-12 bill grossly intact

## 2024-05-06 ENCOUNTER — APPOINTMENT (OUTPATIENT)
Dept: VASCULAR SURGERY | Facility: CLINIC | Age: 69
End: 2024-05-06

## 2024-05-17 ENCOUNTER — APPOINTMENT (OUTPATIENT)
Dept: VASCULAR SURGERY | Facility: CLINIC | Age: 69
End: 2024-05-17
Payer: MEDICARE

## 2024-05-17 VITALS
HEIGHT: 62 IN | SYSTOLIC BLOOD PRESSURE: 130 MMHG | TEMPERATURE: 98.1 F | BODY MASS INDEX: 45.08 KG/M2 | DIASTOLIC BLOOD PRESSURE: 83 MMHG | WEIGHT: 245 LBS | HEART RATE: 118 BPM

## 2024-05-17 PROCEDURE — 99213 OFFICE O/P EST LOW 20 MIN: CPT | Mod: 25

## 2024-05-17 PROCEDURE — 29580 STRAPPING UNNA BOOT: CPT | Mod: 50

## 2024-05-17 NOTE — PHYSICAL EXAM
[1+] : left 1+ [Ankle Swelling (On Exam)] : present [Ankle Swelling Bilaterally] : bilaterally  [Varicose Veins Of Lower Extremities] : bilaterally [Ankle Swelling On The Right] : mild [] : bilaterally [Ankle Swelling On The Left] : moderate [Alert] : alert [Oriented to Person] : oriented to person [Oriented to Place] : oriented to place [Oriented to Time] : oriented to time [Calm] : calm [JVD] : no jugular venous distention  [Skin Ulcer] : ulcer [de-identified] : nad  [de-identified] : wnl [de-identified] : no respiratory distres [FreeTextEntry1] : Mild  arterial insufficiency w mild trophic skin changes  Moderate  bilateral leg venous insufficiency  w moderate bilateral leg stasis dermatitis, hyperpigmentation in the gator area, lipodermatosclerosis, hyperkeratosis (skin thickening) and moderate bilateral leg edema  Multiple bilateral leg small varicose  veins and spider veins  ant post medial calf and shin  RLE Varicose veins measuring  1-3 mm in size on the calf /shin  LLE Varicose veins measuring  1-3 mm in size on the calf /shin  left lateral dorsal foot wound 1.5 cm length x 3 cm width x 1 mm depth superficial wound no bleeding or drainage [de-identified] : uses a walker [de-identified] : left lateral dorsal foot wound [de-identified] : Bill Cranial nerves 2-12 bill grossly intact [de-identified] : cooperative

## 2024-05-17 NOTE — REVIEW OF SYSTEMS
[Lower Ext Edema] : lower extremity edema [Negative] : Heme/Lymph [As Noted in HPI] : as noted in HPI [Skin Wound] : skin wound

## 2024-05-17 NOTE — DATA REVIEWED
[FreeTextEntry1] : 2/14/2023 Venous Doppler Bill LE  no acute dvt svt                             RLE GSV  insuff from sfj to mid thigh w insuff thigh and calf collaterals                            LLE  insuff thigh and calf collaterals                                        2/14/2023 LUZ MARINA/PVR RLE mod infra inguinal and LLE mod infra inguinal geniculate arterial insuff                                     w vessel calcification                                  Rt LUZ MARINA  .93 Lt LUZ MARINA  .87                               11/6/2023 pt refuses bilateral lower extremity venous doppler  3/12/2024 pt refuses bilateral lower extremity venous doppler  5/17/2024 pt refuses bilateral lower extremity venous doppler

## 2024-05-17 NOTE — HISTORY OF PRESENT ILLNESS
[FreeTextEntry1] : Pt c/t bilateral leg pain swelling heaviness and discomfort worse w activity and by the end of the day\par  Onset  sev mo ago \par  Intensity moderate \par  Pt denies recent injury, travel or thrombophilic event\par  \par  Pt c/o occasional leora leg and foot nocturnal cramps 1-2/year\par  \par  Pt denies any recent  cardiac c/o , SOB, Chest Pain or recent heart attacks \par  \par  pt states left shin wound onset  3 weeks ago  wdiscomfort \par  \par  pt smokes 1 ppd   50 years \par  \par   [de-identified] : pt here to evaluate left lateral foot wound pt is not sure how the wound developed or how long it has been there no bleeding or drainage denies injury or trauma not compliant with compression stockings unable to bend to put on compression stockings states she tries to elevate her legs as much as possible pt still smokes 1 ppd denies claudication or rest pain

## 2024-05-17 NOTE — ASSESSMENT
[Arterial/Venous Disease] : arterial/venous disease [Other: _____] : [unfilled] [FreeTextEntry1] : Impression symptomatic arterial insuff , symptomatic venous insuff w new left lateral dorsal foot wound   Plan Med Conservative management leg elevation, moisturize skin, RLE knee high 20-30 mm hg compression stockings, transition to LLE knee high 20-30 mm hg compression stockings once unna boot d/c wt loss, diet control, exercise program, protective measures Will need home care for LLE unna boot 2x/week Will refer to home care  bedside d/w pt to cut back and quit smoking 5 min return to office in 1 month June 2024 to evaluate left foot wound return to office next tues for unna boot change if home care doesn't start in time

## 2024-05-21 ENCOUNTER — APPOINTMENT (OUTPATIENT)
Dept: VASCULAR SURGERY | Facility: CLINIC | Age: 69
End: 2024-05-21
Payer: MEDICARE

## 2024-05-21 VITALS
TEMPERATURE: 98.1 F | HEART RATE: 114 BPM | HEIGHT: 62 IN | WEIGHT: 245 LBS | DIASTOLIC BLOOD PRESSURE: 93 MMHG | SYSTOLIC BLOOD PRESSURE: 141 MMHG | BODY MASS INDEX: 45.08 KG/M2

## 2024-05-21 PROCEDURE — 99212 OFFICE O/P EST SF 10 MIN: CPT | Mod: 25

## 2024-05-21 PROCEDURE — 29580 STRAPPING UNNA BOOT: CPT | Mod: 50

## 2024-05-21 NOTE — ASSESSMENT
[FreeTextEntry1] : Impression symptomatic arterial insuff , symptomatic venous insuff w new left lateral dorsal foot wound, wound is healing nicely   Plan Med Conservative management leg elevation, moisturize skin, RLE knee high 20-30 mm hg compression stockings, transition to LLE knee high 20-30 mm hg compression stockings once unna boot d/c wt loss, diet control, exercise program, protective measures Will need home care for LLE unna boot 2x/week Will refer to home care  RLE unna boot applied for compression bedside d/w pt to cut back and quit smoking 5 min return to office in 1 month June 2024 to evaluate left foot wound return to office this Friday for unna boot change with Dr. Sosa if home care doesn't start in time [Arterial/Venous Disease] : arterial/venous disease [Other: _____] : [unfilled]

## 2024-05-21 NOTE — REVIEW OF SYSTEMS
[Lower Ext Edema] : lower extremity edema [As Noted in HPI] : as noted in HPI [Skin Wound] : skin wound [Negative] : Heme/Lymph

## 2024-05-21 NOTE — HISTORY OF PRESENT ILLNESS
[FreeTextEntry1] : Pt c/t bilateral leg pain swelling heaviness and discomfort worse w activity and by the end of the day\par  Onset  sev mo ago \par  Intensity moderate \par  Pt denies recent injury, travel or thrombophilic event\par  \par  Pt c/o occasional leora leg and foot nocturnal cramps 1-2/year\par  \par  Pt denies any recent  cardiac c/o , SOB, Chest Pain or recent heart attacks \par  \par  pt states left shin wound onset  3 weeks ago  wdiscomfort \par  \par  pt smokes 1 ppd   50 years \par  \par   [de-identified] : pt here to evaluate left lateral foot wound left foot wound healing well no drainage not compliant with compression stockings unable to bend to put on compression stockings states she tries to elevate her legs as much as possible pt still smokes 1 ppd denies claudication or rest pain

## 2024-05-21 NOTE — PHYSICAL EXAM
Detail Level: Detailed [JVD] : no jugular venous distention  [1+] : left 1+ [Ankle Swelling (On Exam)] : present [Ankle Swelling Bilaterally] : bilaterally  [Varicose Veins Of Lower Extremities] : bilaterally [Ankle Swelling On The Right] : mild [] : bilaterally [Ankle Swelling On The Left] : moderate [Skin Ulcer] : ulcer [Alert] : alert [Oriented to Person] : oriented to person [Oriented to Place] : oriented to place [Oriented to Time] : oriented to time [Calm] : calm [de-identified] : nad  [de-identified] : wnl [de-identified] : no respiratory distres [FreeTextEntry1] : Mild  arterial insufficiency w mild trophic skin changes  Moderate  bilateral leg venous insufficiency  w moderate bilateral leg stasis dermatitis, hyperpigmentation in the gator area, lipodermatosclerosis, hyperkeratosis (skin thickening) and moderate bilateral leg edema  Multiple bilateral leg small varicose  veins and spider veins  ant post medial calf and shin  RLE Varicose veins measuring  1-3 mm in size on the calf /shin  LLE Varicose veins measuring  1-3 mm in size on the calf /shin  left lateral dorsal foot wound 1.5 cm length x 3 cm width x 1 mm depth superficial wound and is healing well no bleeding or drainage [de-identified] : uses a walker [de-identified] : left lateral dorsal foot wound [de-identified] : Bill Cranial nerves 2-12 bill grossly intact [de-identified] : cooperative

## 2024-05-24 ENCOUNTER — APPOINTMENT (OUTPATIENT)
Dept: VASCULAR SURGERY | Facility: CLINIC | Age: 69
End: 2024-05-24
Payer: MEDICARE

## 2024-05-24 VITALS
HEIGHT: 64 IN | BODY MASS INDEX: 41.83 KG/M2 | WEIGHT: 245 LBS | TEMPERATURE: 98.8 F | DIASTOLIC BLOOD PRESSURE: 74 MMHG | SYSTOLIC BLOOD PRESSURE: 116 MMHG | HEART RATE: 102 BPM

## 2024-05-24 PROCEDURE — 29580 STRAPPING UNNA BOOT: CPT | Mod: 50

## 2024-05-24 RX ORDER — SITAGLIPTIN AND METFORMIN HYDROCHLORIDE 50; 500 MG/1; MG/1
50-500 TABLET, FILM COATED ORAL
Refills: 0 | Status: ACTIVE | COMMUNITY

## 2024-05-31 ENCOUNTER — APPOINTMENT (OUTPATIENT)
Dept: VASCULAR SURGERY | Facility: CLINIC | Age: 69
End: 2024-05-31

## 2024-05-31 NOTE — ASSESSMENT
[Arterial/Venous Disease] : arterial/venous disease [FreeTextEntry1] : symptomatic venous insufficiency with a new left lateral dorsal foot wound that is healing well.   Plan continue conservative management with leg elevation, moisturize skin, diet control and exercise program not compliant to compression stockings  Will return next week for unna boot change

## 2024-05-31 NOTE — HISTORY OF PRESENT ILLNESS
[FreeTextEntry1] : Pt c/t bilateral leg pain swelling heaviness and discomfort worse w activity and by the end of the day Onset sev mo ago denies any recent cardiac c/o , SOB, Chest Pain or recent heart attacks pt smokes 1 ppd 50 years  presents for unnaboot change of LLE. reports that swelling has improved.  [de-identified] : left foot wound healing well no drainage not compliant with compression stockings pt still smokes 1 ppd denies claudication or rest pain.

## 2024-05-31 NOTE — REASON FOR VISIT
Take medications as directed for next 7 days  Take Augmentin with food to avoid upset stomach  Complete entire course of antibiotics even if feeling better  May continue over-the-counter products for symptoms: flonase with nasal saline for congestion, ibuprofen for body aches, tylenol for fever, and airborne/emergen-c for vitamin supplementation  Follow-up with PCP in 3-5 days if no improvement of symptoms  Report to ER if symptoms worsen  [Follow-Up: _____] : a [unfilled] follow-up visit [FreeTextEntry1] :  No sig changes in medical hx since last visit            6/2/23: reports persistent sx. She wanted to inquire about pumps, she misplaced the prior information. She prefers compression hose.

## 2024-06-28 ENCOUNTER — APPOINTMENT (OUTPATIENT)
Dept: VASCULAR SURGERY | Facility: CLINIC | Age: 69
End: 2024-06-28
Payer: MEDICARE

## 2024-06-28 VITALS
BODY MASS INDEX: 40.29 KG/M2 | DIASTOLIC BLOOD PRESSURE: 77 MMHG | HEIGHT: 64 IN | TEMPERATURE: 98.1 F | WEIGHT: 236 LBS | HEART RATE: 99 BPM | SYSTOLIC BLOOD PRESSURE: 119 MMHG

## 2024-06-28 DIAGNOSIS — I83.893 VARICOSE VEINS OF BILATERAL LOWER EXTREMITIES WITH OTHER COMPLICATIONS: ICD-10-CM

## 2024-06-28 DIAGNOSIS — I87.2 VENOUS INSUFFICIENCY (CHRONIC) (PERIPHERAL): ICD-10-CM

## 2024-06-28 PROCEDURE — 99213 OFFICE O/P EST LOW 20 MIN: CPT | Mod: 25

## 2024-06-28 PROCEDURE — 29580 STRAPPING UNNA BOOT: CPT

## 2024-08-02 ENCOUNTER — APPOINTMENT (OUTPATIENT)
Dept: VASCULAR SURGERY | Facility: CLINIC | Age: 69
End: 2024-08-02
Payer: MEDICARE

## 2024-08-02 VITALS
DIASTOLIC BLOOD PRESSURE: 67 MMHG | HEART RATE: 101 BPM | SYSTOLIC BLOOD PRESSURE: 102 MMHG | BODY MASS INDEX: 40.29 KG/M2 | TEMPERATURE: 98 F | WEIGHT: 236 LBS | HEIGHT: 64 IN

## 2024-08-02 DIAGNOSIS — I83.893 VARICOSE VEINS OF BILATERAL LOWER EXTREMITIES WITH OTHER COMPLICATIONS: ICD-10-CM

## 2024-08-02 DIAGNOSIS — I87.2 VENOUS INSUFFICIENCY (CHRONIC) (PERIPHERAL): ICD-10-CM

## 2024-08-02 PROCEDURE — 99213 OFFICE O/P EST LOW 20 MIN: CPT

## 2024-08-02 NOTE — PHYSICAL EXAM
[1+] : left 1+ [Ankle Swelling (On Exam)] : present [Ankle Swelling Bilaterally] : bilaterally  [Varicose Veins Of Lower Extremities] : bilaterally [Ankle Swelling On The Right] : mild [] : bilaterally [Ankle Swelling On The Left] : moderate [No Rash or Lesion] : No rash or lesion [Alert] : alert [Oriented to Person] : oriented to person [Oriented to Place] : oriented to place [Oriented to Time] : oriented to time [Calm] : calm [JVD] : no jugular venous distention  [de-identified] : nad  [de-identified] : wnl [de-identified] : no respiratory distres [FreeTextEntry1] : Mild  arterial insufficiency w mild trophic skin changes  Moderate  bilateral leg venous insufficiency  w moderate bilateral leg stasis dermatitis, hyperpigmentation in the gator area, lipodermatosclerosis, hyperkeratosis (skin thickening) and moderate bilateral leg edema  Multiple bilateral leg small varicose  veins and spider veins  ant post medial calf and shin  RLE Varicose veins measuring  1-3 mm in size on the calf /shin  LLE Varicose veins measuring  1-3 mm in size on the calf /shin  left foot wound healed no wounds [de-identified] : uses a walker [de-identified] : Bill Cranial nerves 2-12 bill grossly intact [de-identified] : cooperative

## 2024-08-02 NOTE — HISTORY OF PRESENT ILLNESS
[FreeTextEntry1] : Pt c/t bilateral leg pain swelling heaviness and discomfort worse w activity and by the end of the day\par  Onset  sev mo ago \par  Intensity moderate \par  Pt denies recent injury, travel or thrombophilic event\par  \par  Pt c/o occasional leora leg and foot nocturnal cramps 1-2/year\par  \par  Pt denies any recent  cardiac c/o , SOB, Chest Pain or recent heart attacks \par  \par  pt states left shin wound onset  3 weeks ago  wdiscomfort \par  \par  pt smokes 1 ppd   50 years \par  \par   [de-identified] : pt here to evaluate left lateral foot wound pt was getting bilateral unna boot 2x/week in rehab discharged from rehab and living at home now left lateral foot wound is healed reports leg swelling improved with unna boot therapy pt purchased compression wraps states she has trouble putting them on 2/2 back problems states she elevates her legs she has been smoke free no wounds

## 2024-08-02 NOTE — ASSESSMENT
[Arterial/Venous Disease] : arterial/venous disease [Other: _____] : [unfilled] [FreeTextEntry1] : Impression symptomatic arterial insuff , symptomatic venous insuff, healed left foot wound    Plan Med Conservative management leg elevation, moisturize skin, knee high 20-30 mm hg compression stockings, wt loss, diet control, exercise program, protective measures Remain smoke free Return to office in 3 months to evaluate bilateral lower extremities s/o venous insufficiency

## 2024-11-08 ENCOUNTER — APPOINTMENT (OUTPATIENT)
Dept: VASCULAR SURGERY | Facility: CLINIC | Age: 69
End: 2024-11-08

## 2024-12-10 ENCOUNTER — APPOINTMENT (OUTPATIENT)
Dept: VASCULAR SURGERY | Facility: CLINIC | Age: 69
End: 2024-12-10
Payer: MEDICARE

## 2024-12-10 VITALS
DIASTOLIC BLOOD PRESSURE: 85 MMHG | BODY MASS INDEX: 44.16 KG/M2 | HEART RATE: 114 BPM | TEMPERATURE: 98.7 F | HEIGHT: 62 IN | WEIGHT: 240 LBS | SYSTOLIC BLOOD PRESSURE: 120 MMHG

## 2024-12-10 DIAGNOSIS — I83.893 VARICOSE VEINS OF BILATERAL LOWER EXTREMITIES WITH OTHER COMPLICATIONS: ICD-10-CM

## 2024-12-10 DIAGNOSIS — I87.2 VENOUS INSUFFICIENCY (CHRONIC) (PERIPHERAL): ICD-10-CM

## 2024-12-10 PROCEDURE — 99406 BEHAV CHNG SMOKING 3-10 MIN: CPT

## 2024-12-10 PROCEDURE — 99213 OFFICE O/P EST LOW 20 MIN: CPT | Mod: 25

## 2025-04-15 ENCOUNTER — APPOINTMENT (OUTPATIENT)
Dept: VASCULAR SURGERY | Facility: CLINIC | Age: 70
End: 2025-04-15
Payer: MEDICARE

## 2025-04-15 VITALS
HEIGHT: 62 IN | SYSTOLIC BLOOD PRESSURE: 136 MMHG | BODY MASS INDEX: 44.16 KG/M2 | HEART RATE: 77 BPM | TEMPERATURE: 98.5 F | DIASTOLIC BLOOD PRESSURE: 88 MMHG | WEIGHT: 240 LBS

## 2025-04-15 DIAGNOSIS — I83.893 VARICOSE VEINS OF BILATERAL LOWER EXTREMITIES WITH OTHER COMPLICATIONS: ICD-10-CM

## 2025-04-15 DIAGNOSIS — I87.2 VENOUS INSUFFICIENCY (CHRONIC) (PERIPHERAL): ICD-10-CM

## 2025-04-15 PROCEDURE — 99406 BEHAV CHNG SMOKING 3-10 MIN: CPT

## 2025-04-15 PROCEDURE — 99213 OFFICE O/P EST LOW 20 MIN: CPT | Mod: 25

## 2025-04-15 RX ORDER — LETROZOLE TABLETS 2.5 MG/1
2.5 TABLET, FILM COATED ORAL
Refills: 0 | Status: ACTIVE | COMMUNITY